# Patient Record
Sex: MALE
[De-identification: names, ages, dates, MRNs, and addresses within clinical notes are randomized per-mention and may not be internally consistent; named-entity substitution may affect disease eponyms.]

---

## 2017-05-02 ENCOUNTER — CHARTING TRANS (OUTPATIENT)
Dept: OTHER | Age: 57
End: 2017-05-02

## 2017-05-02 ASSESSMENT — PAIN SCALES - GENERAL: PAINLEVEL_OUTOF10: 0

## 2018-11-03 VITALS
TEMPERATURE: 98.7 F | DIASTOLIC BLOOD PRESSURE: 72 MMHG | HEIGHT: 70 IN | HEART RATE: 91 BPM | SYSTOLIC BLOOD PRESSURE: 160 MMHG | WEIGHT: 205 LBS | RESPIRATION RATE: 18 BRPM | BODY MASS INDEX: 29.35 KG/M2

## 2021-11-11 ENCOUNTER — APPOINTMENT (OUTPATIENT)
Dept: GENERAL RADIOLOGY | Facility: HOSPITAL | Age: 61
DRG: 645 | End: 2021-11-11
Attending: EMERGENCY MEDICINE
Payer: COMMERCIAL

## 2021-11-11 ENCOUNTER — APPOINTMENT (OUTPATIENT)
Dept: CT IMAGING | Facility: HOSPITAL | Age: 61
DRG: 645 | End: 2021-11-11
Attending: EMERGENCY MEDICINE
Payer: COMMERCIAL

## 2021-11-11 ENCOUNTER — HOSPITAL ENCOUNTER (INPATIENT)
Facility: HOSPITAL | Age: 61
LOS: 3 days | Discharge: ANOTHER HOSPITAL | DRG: 645 | End: 2021-11-14
Attending: EMERGENCY MEDICINE | Admitting: INTERNAL MEDICINE
Payer: COMMERCIAL

## 2021-11-11 DIAGNOSIS — E04.9 GOITER: ICD-10-CM

## 2021-11-11 DIAGNOSIS — R06.03 RESPIRATORY DISTRESS: Primary | ICD-10-CM

## 2021-11-11 DIAGNOSIS — J06.9 UPPER RESPIRATORY TRACT INFECTION, UNSPECIFIED TYPE: ICD-10-CM

## 2021-11-11 PROCEDURE — 96365 THER/PROPH/DIAG IV INF INIT: CPT | Performed by: EMERGENCY MEDICINE

## 2021-11-11 PROCEDURE — 71045 X-RAY EXAM CHEST 1 VIEW: CPT | Performed by: EMERGENCY MEDICINE

## 2021-11-11 PROCEDURE — 94644 CONT INHLJ TX 1ST HOUR: CPT

## 2021-11-11 PROCEDURE — 94640 AIRWAY INHALATION TREATMENT: CPT

## 2021-11-11 PROCEDURE — 83605 ASSAY OF LACTIC ACID: CPT | Performed by: EMERGENCY MEDICINE

## 2021-11-11 PROCEDURE — S0077 INJECTION, CLINDAMYCIN PHOSP: HCPCS | Performed by: EMERGENCY MEDICINE

## 2021-11-11 PROCEDURE — 83880 ASSAY OF NATRIURETIC PEPTIDE: CPT | Performed by: EMERGENCY MEDICINE

## 2021-11-11 PROCEDURE — 84439 ASSAY OF FREE THYROXINE: CPT | Performed by: EMERGENCY MEDICINE

## 2021-11-11 PROCEDURE — 80053 COMPREHEN METABOLIC PANEL: CPT | Performed by: EMERGENCY MEDICINE

## 2021-11-11 PROCEDURE — 36415 COLL VENOUS BLD VENIPUNCTURE: CPT | Performed by: EMERGENCY MEDICINE

## 2021-11-11 PROCEDURE — 84484 ASSAY OF TROPONIN QUANT: CPT | Performed by: EMERGENCY MEDICINE

## 2021-11-11 PROCEDURE — 85379 FIBRIN DEGRADATION QUANT: CPT | Performed by: EMERGENCY MEDICINE

## 2021-11-11 PROCEDURE — 99292 CRITICAL CARE ADDL 30 MIN: CPT | Performed by: EMERGENCY MEDICINE

## 2021-11-11 PROCEDURE — 96375 TX/PRO/DX INJ NEW DRUG ADDON: CPT | Performed by: EMERGENCY MEDICINE

## 2021-11-11 PROCEDURE — 99291 CRITICAL CARE FIRST HOUR: CPT | Performed by: EMERGENCY MEDICINE

## 2021-11-11 PROCEDURE — 85025 COMPLETE CBC W/AUTO DIFF WBC: CPT | Performed by: EMERGENCY MEDICINE

## 2021-11-11 PROCEDURE — 70490 CT SOFT TISSUE NECK W/O DYE: CPT | Performed by: EMERGENCY MEDICINE

## 2021-11-11 PROCEDURE — 96367 TX/PROPH/DG ADDL SEQ IV INF: CPT | Performed by: EMERGENCY MEDICINE

## 2021-11-11 PROCEDURE — 93010 ELECTROCARDIOGRAM REPORT: CPT | Performed by: EMERGENCY MEDICINE

## 2021-11-11 PROCEDURE — 0CJS8ZZ INSPECTION OF LARYNX, VIA NATURAL OR ARTIFICIAL OPENING ENDOSCOPIC: ICD-10-PCS | Performed by: OTOLARYNGOLOGY

## 2021-11-11 PROCEDURE — 84481 FREE ASSAY (FT-3): CPT | Performed by: EMERGENCY MEDICINE

## 2021-11-11 PROCEDURE — 84443 ASSAY THYROID STIM HORMONE: CPT | Performed by: EMERGENCY MEDICINE

## 2021-11-11 PROCEDURE — 87040 BLOOD CULTURE FOR BACTERIA: CPT | Performed by: EMERGENCY MEDICINE

## 2021-11-11 PROCEDURE — 93005 ELECTROCARDIOGRAM TRACING: CPT

## 2021-11-11 RX ORDER — ONDANSETRON 2 MG/ML
4 INJECTION INTRAMUSCULAR; INTRAVENOUS EVERY 6 HOURS PRN
Status: DISCONTINUED | OUTPATIENT
Start: 2021-11-11 | End: 2021-11-11

## 2021-11-11 RX ORDER — PREDNISONE 20 MG/1
40 TABLET ORAL DAILY
COMMUNITY
Start: 2021-11-07 | End: 2021-11-14

## 2021-11-11 RX ORDER — DEXAMETHASONE SODIUM PHOSPHATE 4 MG/ML
6 VIAL (ML) INJECTION ONCE
Status: COMPLETED | OUTPATIENT
Start: 2021-11-11 | End: 2021-11-11

## 2021-11-11 RX ORDER — MULTIVITAMIN WITH FOLIC ACID 400 MCG
1 TABLET ORAL DAILY
COMMUNITY
End: 2021-11-14

## 2021-11-11 RX ORDER — DEXMEDETOMIDINE HYDROCHLORIDE 4 UG/ML
INJECTION, SOLUTION INTRAVENOUS
Status: COMPLETED
Start: 2021-11-11 | End: 2021-11-11

## 2021-11-11 RX ORDER — ALBUTEROL SULFATE 90 UG/1
2 AEROSOL, METERED RESPIRATORY (INHALATION) EVERY 4 HOURS PRN
COMMUNITY
Start: 2021-11-07 | End: 2021-11-14

## 2021-11-11 RX ORDER — METHYLPREDNISOLONE SODIUM SUCCINATE 125 MG/2ML
125 INJECTION, POWDER, LYOPHILIZED, FOR SOLUTION INTRAMUSCULAR; INTRAVENOUS ONCE
Status: COMPLETED | OUTPATIENT
Start: 2021-11-11 | End: 2021-11-11

## 2021-11-11 RX ORDER — BUDESONIDE 0.5 MG/2ML
1 INHALANT ORAL
Status: DISCONTINUED | OUTPATIENT
Start: 2021-11-11 | End: 2021-11-14

## 2021-11-11 RX ORDER — IPRATROPIUM BROMIDE AND ALBUTEROL SULFATE 2.5; .5 MG/3ML; MG/3ML
3 SOLUTION RESPIRATORY (INHALATION)
Status: DISCONTINUED | OUTPATIENT
Start: 2021-11-11 | End: 2021-11-11

## 2021-11-11 RX ORDER — DEXAMETHASONE SODIUM PHOSPHATE 4 MG/ML
6 VIAL (ML) INJECTION EVERY 8 HOURS
Status: DISCONTINUED | OUTPATIENT
Start: 2021-11-11 | End: 2021-11-14

## 2021-11-11 RX ORDER — IPRATROPIUM BROMIDE AND ALBUTEROL SULFATE 2.5; .5 MG/3ML; MG/3ML
3 SOLUTION RESPIRATORY (INHALATION)
Status: DISCONTINUED | OUTPATIENT
Start: 2021-11-11 | End: 2021-11-14

## 2021-11-11 RX ORDER — HEPARIN SODIUM 5000 [USP'U]/ML
5000 INJECTION, SOLUTION INTRAVENOUS; SUBCUTANEOUS EVERY 8 HOURS SCHEDULED
Status: DISCONTINUED | OUTPATIENT
Start: 2021-11-11 | End: 2021-11-14

## 2021-11-11 RX ORDER — SODIUM CHLORIDE 9 MG/ML
INJECTION, SOLUTION INTRAVENOUS CONTINUOUS
Status: DISCONTINUED | OUTPATIENT
Start: 2021-11-11 | End: 2021-11-14

## 2021-11-11 RX ORDER — CLINDAMYCIN PHOSPHATE 600 MG/50ML
600 INJECTION INTRAVENOUS ONCE
Status: COMPLETED | OUTPATIENT
Start: 2021-11-11 | End: 2021-11-11

## 2021-11-11 RX ORDER — DEXMEDETOMIDINE HYDROCHLORIDE 4 UG/ML
INJECTION, SOLUTION INTRAVENOUS CONTINUOUS
Status: DISCONTINUED | OUTPATIENT
Start: 2021-11-11 | End: 2021-11-14

## 2021-11-11 RX ORDER — PROCHLORPERAZINE EDISYLATE 5 MG/ML
5 INJECTION INTRAMUSCULAR; INTRAVENOUS EVERY 8 HOURS PRN
Status: DISCONTINUED | OUTPATIENT
Start: 2021-11-11 | End: 2021-11-14

## 2021-11-11 NOTE — ED QUICK NOTES
Let House Supervisor know that all transfer options have been exhausted now and going to admit patient to ICU here now   Charge RN aware as well

## 2021-11-11 NOTE — PLAN OF CARE
Received pt from ED, SOB and increased WOB on admission to unit. Pt diaphoretic, head leaned back in \"sniffing\" position. Stridor noted. Dr. Chad Baird paged to bedside, assessed, ordered another dose of Racemic Epi and Decadron IV to be given at that time.  P

## 2021-11-11 NOTE — ED QUICK NOTES
[facility] [facility]: no beds; Surge protocol with list  [facility]: no beds  [facility]: no beds

## 2021-11-11 NOTE — CONSULTS
[facility]  Report of Consultation    Floresita Burrows Patient Status:  Emergency    1960 MRN [id number]   Location [facility] [facility] Attending Will, Venkata Beverly MD   Hosp Day # 0 PCP Nestor Oneill MD     Reason for Consultation: and middle ear    Right ear norm tympanic membrane and middle ear   Mastoids left not tender no redness no swelling right not tender no redness no swelling   Oral cavity: normal tongue floor of mouth and tonsils   Nasal exam: turbinates   Congested and sep

## 2021-11-11 NOTE — CONSULTS
Critical Care H&P/Consult     NAME: Domingo Jennings - ROOM: [facility] - MRN: [id number] - Age: 62year old - :  1960    Date of Admission: 2021  6:00 AM  Admission Diagnosis: No admission diagnoses are documented for this encounter.       Assessment Substernal thyroid     Dx in 8/2014: thyroid U/S shows a large multinodular goiter large nodules and substernal extension    • Toxic diffuse goiter     Dx in 5/2014 - thyroid tests are abnormal and TSI positive     Past Surgical History:   Procedure Felipe Hunt (DECADRON) 4 MG/ML injection 6 mg, 6 mg, Intravenous, Once        Review of systems:   12 point ROS otherwise negative unless noted in the HPI    Objective:     Intake/Output Summary (Last 24 hours) at 11/11/2021 1231  Last data filed at 11/11/2021 1154  Gr

## 2021-11-11 NOTE — ED QUICK NOTES
RT at bedside. Pt seated upright in tripod position, pt noted to be lifting his chin with each breath, accessory muscles in use. To monitor closely. MD aware.

## 2021-11-11 NOTE — ED PROVIDER NOTES
Patient Seen in: [facility] Emergency Department      History   Patient presents with:  Difficulty Breathing    Stated Complaint: respiratory distress    Subjective:   HPI    The patient is a 28-year-old male with a history of hyperthyroidism, multi Review of Systems    Positive for stated complaint: respiratory distress  Other systems are as noted in HPI. Constitutional and vital signs reviewed. All other systems reviewed and negative except as noted above.     Physical Exam     ED Triage cooperative with exam       ED Course     Labs Reviewed   COMP METABOLIC PANEL (14) - Abnormal; Notable for the following components:       Result Value    Glucose 178 (*)     All other components within normal limits   PRO BETA NATRIURETIC PEPTIDE - Abnor nebulizer treatment. Blood was obtained and peripheral IV access was established. He was given IV Solu-Medrol. Chest x-ray was obtained.          [facility]          CT SOFT TISSUE OF NECK (CPT=70490)   Final Result    PROCEDURE:  CT SOFT TISSUE OF NECK (CPT=7 No cervical adenopathy is seen. There is mild opacification of the     ethmoid sinus. There is opacification of the left sphenoid sinus. Degenerative changes in the spine. No consolidation is seen within the lung apices. Atrovent, his lung started opening up, he was having a little better air movement, and it was more clearly inspiratory stridor. He was given racemic epinephrine. On repeat examination he was feeling greatly improved. Able to sit back.   And relax little being discharged from [facility] after his ICU stay, Dr. Seth Oneil of ENT at [facility] would gladly take care of the patient afterwards for his thyroid surgery. Per discussion with Dr. Seth Oneil, he was called by Parth Sun and [place] of ENT here at goiter causing airway narrowing and deviation. This involved direct patient intervention, complex decision making, and/or extensive discussions with the patient, family, and clinical staff.                                  Disposition and Plan     Clinical

## 2021-11-11 NOTE — ED INITIAL ASSESSMENT (HPI)
Patient states he was dx with upper respiratory infection on Sunday, testing negative for covid. Patient states he had wheezing and difficulty breathing after using his prescribed inhaler.   Patient found in tripod position on ems arrival.

## 2021-11-11 NOTE — RESPIRATORY THERAPY NOTE
Received pt on 6lpm nc saturating mid 90s. Pt BS diminished, stridor noted. Pt diaphoretic, labored breathing. Pt given racemic epi nebulizer per Dr. Tricia Lopez. Discussed plan for intubation if pt distress continues. Dr. Tricia Lopez discussed with ENT.  Plan to intub

## 2021-11-11 NOTE — ED QUICK NOTES
Per ENT [place], patient airway stable now. Patient states he feels at his baseline now. ENT unable to do surgery today or tomorrow, plan for transfer at this time.

## 2021-11-11 NOTE — H&P
[facility]  Hospitalist Team  History and Physical       Assessment/Plan:     #Stridor, acute respiratory distress - due to large goiter  -ENT and Crit care on consult  -iv dexamethasone  -iv unasyn  -cont nebs, rac epi as needed  -attempted to transfer to [facility] outpatient medications on file.       Social History    Tobacco Use      Smoking status: Never Smoker      Smokeless tobacco: Never Used    Alcohol use: Yes      Comment: 8 to 10 per week       Fam Hx  Family History   Problem Relation Age of Onset   • Canc lesions.     Neurologic: Moving all extremities spontaneously, no focal deficit appreciated     Data Review:    LABS:   Lab Results   Component Value Date    WBC 14.1 11/11/2021    HGB 15.9 11/11/2021    HCT 44.6 11/11/2021    .0 11/11/2021    CREATS left thyroid goiter measures 6.6 x 6.3 by 11 cm. The smaller right thyroid nodule measures 2.9 cm. Both thyroid lobes extend into the upper mediastinum, left much greater than right.   The noncontrast appearance of the parotid and submandibular glands are consolidation. Dictated by (CST): Jennie Cuevas MD on 11/11/2021 at 7:22 AM     Finalized by (CST): Jennie Cuevas MD on 11/11/2021 at 7:23 AM            Outpatient records or previous hospital records reviewed.    [facility] hospitalist to continue to follow patient

## 2021-11-11 NOTE — PROGRESS NOTES
Re-evaluated pt with anesthesia. Discussed should intubation be necessary would err on the side of a smaller ET tube (6.5) if possible and use fiberoptic for visualization to minimize trauma to his distal tracheal obstruction.  Pt currently more calm on low

## 2021-11-11 NOTE — ED QUICK NOTES
Pt awake and alert, skin w/d,resps appear less labored. Pt seated upright on cart, now able to sit back more comfortably. Wife at bedside.

## 2021-11-11 NOTE — ED QUICK NOTES
Pt appears more comfortable on cart. Pt remains in tripod position while receiving racemic epi but appears more comfortable, resps appear less labored. Pt states he is feeling better.      RT at bedside, will place cool air mask upon completion of racemic e

## 2021-11-12 PROCEDURE — 94640 AIRWAY INHALATION TREATMENT: CPT

## 2021-11-12 PROCEDURE — 85025 COMPLETE CBC W/AUTO DIFF WBC: CPT | Performed by: INTERNAL MEDICINE

## 2021-11-12 PROCEDURE — 83735 ASSAY OF MAGNESIUM: CPT | Performed by: NURSE PRACTITIONER

## 2021-11-12 PROCEDURE — 84100 ASSAY OF PHOSPHORUS: CPT | Performed by: NURSE PRACTITIONER

## 2021-11-12 PROCEDURE — 80048 BASIC METABOLIC PNL TOTAL CA: CPT | Performed by: INTERNAL MEDICINE

## 2021-11-12 RX ORDER — LABETALOL HYDROCHLORIDE 5 MG/ML
10 INJECTION, SOLUTION INTRAVENOUS ONCE
Status: COMPLETED | OUTPATIENT
Start: 2021-11-12 | End: 2021-11-12

## 2021-11-12 RX ORDER — HYDRALAZINE HYDROCHLORIDE 20 MG/ML
10 INJECTION INTRAMUSCULAR; INTRAVENOUS
Status: DISCONTINUED | OUTPATIENT
Start: 2021-11-12 | End: 2021-11-14

## 2021-11-12 NOTE — PAYOR COMM NOTE
--------------  ADMISSION REVIEW     Payor: SHANTE ANN  Subscriber #:  [id number]  Authorization Number: [id number]    Admit date: 11/11/21  Admit time:  3:16 PM       REVIEW DOCUMENTATION:     ED Provider Notes      ED Provider Notes signed by Soheila Vu U/S shows a large multinodular goiter large nodules and substernal extension    • Substernal thyroid     Dx in 8/2014: thyroid U/S shows a large multinodular goiter large nodules and substernal extension    • Toxic diffuse goiter     Dx in 5/2014 - thyroid CVA tenderness. Extremities: No deformity, nontender throughout, and normal active range of motion of all 4 extremities. Distal pulses normal and symmetric. No calf swelling, asymmetry, tenderness or cords. Skin: No masses or nodules or abnormalities. Patient was placed on continuous pulse oximetry and cardiac telemetry. Sample was collected for rapid Covid testing. He was given continuous albuterol and Atrovent nebulizer treatment.    Blood was obtained and peripheral IV access was established unremarkable. No acute abnormality is seen within the oral cavity on this     noncontrast examination. No large mass is seen within the nasopharynx or     oropharynx. No cervical adenopathy is seen.   There is mild opacification of the     ethm 11/11/2021 at 7:22 AM         Finalized by (CST): Gil Doty MD on 11/11/2021 at 7:23 AM                Admission disposition: 11/11/2021 12:23 PM       After albuterol and Atrovent, his lung started opening up, he was having a little better air movement, if his airway did worsen he could be intubated. In which case he could then be transferred to [facility] for definitive management.     However if he does improve and he ends up being discharged from [facility] after his ICU stay, Dr. Teresa Alejandro of ENT at (exclusive of billable procedures) was administered to manage the patient's respiratory instability due to his airway compromise due to URI and severely enlarged thyroid goiter causing airway narrowing and deviation.   This involved direct patient intervent johnnie who presents to Er with SOB and wheezing. Pt states his wife recently had PNA and he started feeling sick last week with headache, sore throat, and cough.  Yesterday he couldn't sleep as he felt SOB and this morning his breathing was even more diffic muscle cramps  SKIN:  (-) rashes (-) hair loss  NEUROLOGIC:  (-) paresthesias (-) weakness (-) numbness  PSYCHIATRIC:  (-) disorder of thought or mood  ENDOCRINE:  (-) heat or cold intolerance (-) polyuria (-) polydipsia  HEMATOLOGICAL:  (-) easy bruising COMPARISON:  None. INDICATIONS:  respiratory distress, with stridor, h/o enlarged multinodular goiter, please scan down through goiter and at least down to saw  TECHNIQUE:  Noncontrast CT scanning is performed through the neck soft tissues.   Dose reduc goiter extends into the upper mediastinum, left greater than right. In the region of severe tracheal narrowing, the patent trachea measures 10 x 4 mm in the axial plane. There are pooling secretions within this region which contributes to this narrowing. problem acutely exacerbated by a respiratory infection  - ENT has seen, initial recommendation for transfer to see if a surgeon would be available to address this large goiter but [facility] beds available at [facility]   - Pt had improvement after stero DO  [facility] Hospitalist  [phone]            Subjective:                                                                                     SUBJECTIVE: feels better today. Breathing is improved.  Still having stridor     OBJECTIVE:  Temp:  [97.3 °F (36.3 ° Route User    11/12/2021 0502 New Bag 3 g Intravenous Osman Bruno RN    11/11/2021 2304 New Bag 3 g Intravenous Osman Bruno RN    11/11/2021 1537 New Bag 3 g Intravenous Lora Velazquez RN      Ampicillin-Sulbactam Sodium (UNASYN) 3 g in sodium UNIT/ML injection 5,000 Units     Date Action Dose Route User    11/12/2021 0503 Given 5,000 Units Subcutaneous (Left Lower Abdomen) Trupti Lopez RN    11/11/2021 2104 Given 5,000 Units Subcutaneous (Left Lower Abdomen) Trupti Lopez RN      jaz 11/12/21 0100 — 64 14 116/69 96 % — — — PB    11/12/21 0000 — 70 13 127/75 95 % — — — PB    11/11/21 2300 97.3 °F (36.3 °C) 62 14 111/70 96 % — High flow nasal cannula 6 L/min PB    11/11/21 2200 — 70 18 126/69 96 % — — — PB    11/11/21 2100 — 92 22 167

## 2021-11-12 NOTE — PROGRESS NOTES
[facility]  Progress Note    Lian Cuenca Patient Status:  Inpatient    1960 MRN [id number]   [facility] 4SW-A Attending Roxann Hancock, DO   Hosp Day # 1 PCP Dl Desai MD     Subjective:  Patient feeling significantly finesse Naty with Dr. Eli Boone for definitive treatment.  Veronica London MD  11/12/2021  5:20 PM

## 2021-11-12 NOTE — PROGRESS NOTES
[facility] Pharmacy Note:  Dose Adjustment for ampicillin/sulbactam (UNASYN)    Nathaniel Nascimento is a 62year old patient who has been prescribed ampicillin/sulbactam (UNASYN) 3000 mg every 6 hrs.   The dose has been adjusted to ampicillin/sulbactam (UNASYN) 3000 mg

## 2021-11-12 NOTE — RESPIRATORY THERAPY NOTE
Received pt on 8L NC, pt has audible stridor. 1x rac epi given after discussion with RN. Suction and  ambu bag set up. Airway cart with smaller ETTs placed near pts room.

## 2021-11-12 NOTE — PROGRESS NOTES
[facility]    S: still with sig insp/exp stridor.     Meds:  • dexamethasone Sodium Phosphate  6 mg Intravenous Q8H   • ampicillin-sulbactam  3 g Intravenous Q6H   • Heparin Sodium (Porcine)  5,000 Units Subcutaneous Q8H [address]   • ipratropium the last 168 hours. Recent Labs     11/11/21  0634   DDIMER 0.45     Lab Results   Component Value Date    COVID19 Not Detected 11/11/2021     No results for input(s): ABGPHT, FTEZHA8I, RLMCF9P, ABGHCO3, LM, FIO2 in the last 72 hours.   Recent Labs     11/

## 2021-11-12 NOTE — RESPIRATORY THERAPY NOTE
Patient received, getting duonebs q4, tolerating well, patient still having stidor, will continue to monitor

## 2021-11-12 NOTE — PROGRESS NOTES
[facility] Hospitalist Team  Progress Note      Romeo Bonilla  8/13/1960    Assessment/Plan:     #Stridor, acute respiratory distress - due to large goiter  -ENT and Crit care on consult  -iv dexamethasone  -iv unasyn  -cont nebs, rac epi as needed  -attempted t ipratropium-albuterol  3 mL Nebulization 6 times per day   • budesonide  1 mg Nebulization Daily     Continuous Infusions:   • sodium chloride 75 mL/hr at 11/11/21 1527   • dexmedetomidine 0.4 mcg/kg/hr (11/12/21 0901)     PRN: Prochlorperazine Edisylate

## 2021-11-12 NOTE — PLAN OF CARE
Received patient following RN report with patient resting in bed, HOB >45. Stridor noted, although patient denied difficulty breathing, stating he \"feels much better from earlier\". [facility] notified. Additional dose of Racemic epi given.  Maintaining saturat

## 2021-11-13 PROCEDURE — 80053 COMPREHEN METABOLIC PANEL: CPT | Performed by: NURSE PRACTITIONER

## 2021-11-13 PROCEDURE — 84100 ASSAY OF PHOSPHORUS: CPT | Performed by: NURSE PRACTITIONER

## 2021-11-13 PROCEDURE — 85025 COMPLETE CBC W/AUTO DIFF WBC: CPT | Performed by: NURSE PRACTITIONER

## 2021-11-13 PROCEDURE — 94640 AIRWAY INHALATION TREATMENT: CPT

## 2021-11-13 PROCEDURE — 83735 ASSAY OF MAGNESIUM: CPT | Performed by: NURSE PRACTITIONER

## 2021-11-13 RX ORDER — ALPRAZOLAM 0.25 MG/1
0.25 TABLET ORAL EVERY 4 HOURS PRN
Status: DISCONTINUED | OUTPATIENT
Start: 2021-11-13 | End: 2021-11-14

## 2021-11-13 RX ORDER — MORPHINE SULFATE 2 MG/ML
2 INJECTION, SOLUTION INTRAMUSCULAR; INTRAVENOUS ONCE
Status: COMPLETED | OUTPATIENT
Start: 2021-11-13 | End: 2021-11-13

## 2021-11-13 NOTE — PLAN OF CARE
Assumed care of patient at 0730. Audible stridor at start off shift. O2 via HFlo; able to titrate down with  Much improvement in breathing and stridor by evening. Precedex gtt infusing; titrate to minimal dose.  Patient resting comfortably and able to slee

## 2021-11-13 NOTE — PLAN OF CARE
Assumed care of patient following shift report. Patient is alert and oriented. Patient denies pain. Upon initial assessment, patient clearly anxious and having stridor. Increasing precedex to help, took a while to get patient back to a comfortable level.  P

## 2021-11-13 NOTE — PLAN OF CARE
Received pt AOx4, follows commands, denies pain. precedex gtt infusing, titrated down d/t bradycardia. O2 sats >95% on 3L O2 via HFNC. Stridor and inspiratory wheezes noted, wheezing improved w/ scheduled neb treatments. Stridor increased this evening.  Rac

## 2021-11-13 NOTE — PROGRESS NOTES
[facility]  Progress Note    Wale Costa Patient Status:  Inpatient    1960 MRN [id number]   [facility] 4SW-A Attending Juanjose Carpenter DO   Hosp Day # 2 PCP Anderson Vera MD     Subjective:  Patient feeling significantly finesse discomfort and worsened with anxiety. .  Continue IV abx and IV steroids. Given issues overnight then continue to monitor in ICU. No beds available still at [facility]. Can start clear liquids and take meds with sips. Discussed with patient family.   Discuss

## 2021-11-13 NOTE — PROGRESS NOTES
[facility] Hospitalist Team  Progress Note      Cleve Diaz  8/13/1960    Assessment/Plan:     #Stridor, acute respiratory distress - due to large goiter  -ENT and Crit care on consult  -iv dexamethasone  -iv unasyn  -cont nebs, rac epi as needed  -attempted t PGLU in the last 168 hours.     Meds:   Scheduled:   • ampicillin-sulbactam  3 g Intravenous Q8H   • racepinephrine HCl  0.5 mL Nebulization Once   • dexamethasone Sodium Phosphate  6 mg Intravenous Q8H   • Heparin Sodium (Porcine)  5,000 Units Subcutaneous

## 2021-11-13 NOTE — PROGRESS NOTES
[facility]    S: Overnight with some increased stridor with agitation and increased WOB.   Improved this AM.    Meds:  • ampicillin-sulbactam  3 g Intravenous Q8H   • racepinephrine HCl  0.5 mL Nebulization Once   • dexamethasone Sodium José Antonio 178* 176* 184*   BUN 14 22* 29*   CREATSERUM 0.87 0.91 0.72   GFRAA 108 106 117   GFRNAA 94 91 101   CA 9.4 9.4 8.7   ALB 4.0  --  3.3*    139 142   K 3.8 4.2 4.4    103 108   CO2 25.0 31.0 29.0   ALKPHO 87  --  68   AST 28  --  15   ALT 54  -- M.D.  [facility] and Sleep [facility]

## 2021-11-13 NOTE — PLAN OF CARE
Problem: RESPIRATORY - ADULT  Goal: Achieves optimal ventilation and oxygenation  Description: INTERVENTIONS:  - Assess for changes in respiratory status  - Assess for changes in mentation and behavior  - Position to facilitate oxygenation and minimize res

## 2021-11-14 VITALS
DIASTOLIC BLOOD PRESSURE: 70 MMHG | RESPIRATION RATE: 15 BRPM | BODY MASS INDEX: 29 KG/M2 | SYSTOLIC BLOOD PRESSURE: 138 MMHG | TEMPERATURE: 99 F | HEART RATE: 76 BPM | WEIGHT: 201.25 LBS | OXYGEN SATURATION: 93 %

## 2021-11-14 PROCEDURE — 84100 ASSAY OF PHOSPHORUS: CPT | Performed by: NURSE PRACTITIONER

## 2021-11-14 PROCEDURE — 36600 WITHDRAWAL OF ARTERIAL BLOOD: CPT | Performed by: INTERNAL MEDICINE

## 2021-11-14 PROCEDURE — 82803 BLOOD GASES ANY COMBINATION: CPT | Performed by: INTERNAL MEDICINE

## 2021-11-14 PROCEDURE — 85018 HEMOGLOBIN: CPT | Performed by: INTERNAL MEDICINE

## 2021-11-14 PROCEDURE — 83735 ASSAY OF MAGNESIUM: CPT | Performed by: NURSE PRACTITIONER

## 2021-11-14 PROCEDURE — 94640 AIRWAY INHALATION TREATMENT: CPT

## 2021-11-14 PROCEDURE — 80053 COMPREHEN METABOLIC PANEL: CPT | Performed by: NURSE PRACTITIONER

## 2021-11-14 PROCEDURE — 85025 COMPLETE CBC W/AUTO DIFF WBC: CPT | Performed by: NURSE PRACTITIONER

## 2021-11-14 PROCEDURE — 82375 ASSAY CARBOXYHB QUANT: CPT | Performed by: INTERNAL MEDICINE

## 2021-11-14 PROCEDURE — 83050 HGB METHEMOGLOBIN QUAN: CPT | Performed by: INTERNAL MEDICINE

## 2021-11-14 NOTE — PROGRESS NOTES
[facility]    S: No new events overnight.     Meds:  • ampicillin-sulbactam  3 g Intravenous Q8H   • dexamethasone Sodium Phosphate  6 mg Intravenous Q8H   • Heparin Sodium (Porcine)  5,000 Units Subcutaneous Q8H [address]   • ipratropium-albuter 4.0  --  3.3*    139 142   K 3.8 4.2 4.4    103 108   CO2 25.0 31.0 29.0   ALKPHO 87  --  68   AST 28  --  15   ALT 54  --  39   BILT 0.7  --  0.4   TP 7.8  --  6.7     No results for input(s): PCT in the last 168 hours.   No results for input(s

## 2021-11-14 NOTE — PROGRESS NOTES
[facility]  Progress Note    Domingo Jennings Patient Status:  Inpatient    1960 MRN [id number]   [facility] 4SW-A Attending Christophe Bryant, [address] Day # 3 PCP Marianela Terrazas MD     Subjective:  Patient feeling significantly finesse Would be best to treat this definitively before other issues arise. Agree with transfer to [facility]. Discussed with patient who was comfortable with plan and eager to proceed. Transfer in progress.      Lynelle Schaumann  Otolaryngology - Head & Neck Surgery    T

## 2021-11-14 NOTE — PLAN OF CARE
Assumed care of patient at [address] from ED. Patient stated nasal cannula was uncomfortable requested to take it off. VSS. Discussed plan of care with patient. All questions answered.

## 2021-11-14 NOTE — PROGRESS NOTES
[facility] Hospitalist Team  Progress Note      Cleve Diaz  8/13/1960    Assessment/Plan:     #Stridor, acute respiratory distress - due to large goiter  -ENT and Crit care on consult  -iv dexamethasone  -iv unasyn  -cont nebs, rac epi as needed  -attempted t ALT 54 39 35   AST 28 15 14*   ALB 4.0 3.3* 2.9*       No results for input(s): PGLU in the last 168 hours.     Meds:   Scheduled:   • ampicillin-sulbactam  3 g Intravenous Q8H   • dexamethasone Sodium Phosphate  6 mg Intravenous Q8H   • Heparin Sodium (P

## 2021-11-14 NOTE — PLAN OF CARE
Assumed care of patient at [address]. A/Ox4 on 2L O2. SBP in 180's. Hydralazine given. Precedex titrated to 0.4 for RASS goal of 0 and O2 bumped to 4L to maintain SpO2 >90%.  Patient breathing more comfortably tonight and moved from chair to bed with standby ass

## 2021-11-14 NOTE — PLAN OF CARE
Assumed care of patient this am, discussed with transfer center from [facility] [place], patient transfer in progress.  Patient transferred to [address] at [phone] for higher level of care - surgical intervention of large goiter, report called to Riya MOMIN, jaziel q

## 2021-11-15 NOTE — DISCHARGE SUMMARY
General Medicine Discharge Summary     Patient ID:  Donna Jacobo  62year old  8/13/1960    Admit date: 11/11/2021    Discharge date and time: 11/14/2021  1:30 PM     Attending Physician: Yadira England Tab          Activity: activity as tolerated  Diet: clear liquids, advance as tolerated  Wound Care: as directed  Code Status: Full Code      Exam on day of discharge:      11/14/21  0900   BP: 138/70   Pulse: 76   Resp: 15   Temp:        General: no acute

## 2021-11-15 NOTE — PAYOR COMM NOTE
--------------  DISCHARGE REVIEW----REQUESTING ADDITIONAL DAY 11/13 WITH DISCHARGE ON 11/14      [facility]  Subscriber #:  [id number]  Authorization Number: [id number]    Admit date: 11/11/21  Admit time:   3:16 PM  Discharge Date: 11/14/2021  1:30 243.0 283. 0               Recent Labs   Lab 11/11/21  0616 11/12/21  0438 11/13/21  0518    139 142   K 3.8 4.2 4.4    103 108   CO2 25.0 31.0 29.0   BUN 14 22* 29*   CREATSERUM 0.87 0.91 0.72   CA 9.4 9.4 8.7   MG  --  2.6 2.8*   PHOS  --  3.8

## 2021-12-20 ENCOUNTER — LAB ENCOUNTER (OUTPATIENT)
Dept: LAB | Facility: HOSPITAL | Age: 61
End: 2021-12-20
Payer: COMMERCIAL

## 2021-12-20 DIAGNOSIS — E04.9 GOITER: Primary | ICD-10-CM

## 2021-12-20 PROCEDURE — 36415 COLL VENOUS BLD VENIPUNCTURE: CPT

## 2021-12-20 PROCEDURE — 84439 ASSAY OF FREE THYROXINE: CPT

## 2021-12-20 PROCEDURE — 84481 FREE ASSAY (FT-3): CPT

## 2021-12-20 PROCEDURE — 84443 ASSAY THYROID STIM HORMONE: CPT

## (undated) NOTE — IP AVS SNAPSHOT
[address]            (For Outpatient Use Only) Initial Admit Date: 11/11/2021   Inpt/Obs Admit Date: Inpt: 11/11/21 / Obs: N/A   Discharge Date:    Doneen Martin:  [de-identified]   MRN: [de-identified]   CSN: [id number]   CEID: [id number] [place] [facility]    November 14, 2021

## (undated) NOTE — IP AVS SNAPSHOT
Patient Demographics     Address  [address] [address] Phone  [phone] [facility])  [phone] (Mobile) *Preferred* E-mail Address  [email or web address]      Emergency Contact(s)     Name Relation Home Work [address] [id number] dexamethasone Sodium Phosphate (DECADRON) 4 MG/ML injection 6 mg 11/14/21 0055 Given      [id number] dexamethasone Sodium Phosphate (DECADRON) 4 MG/ML injection 6 mg 11/14/21 1009 Given      [id number] guaiFENesin (ROBITUSSIN) 100mg/5ml LIQUID 100 kg (201 lb 4.5 oz)         Lab Results Last 24 Hours      Arterial blood gas [[id number]] (Abnormal)  Resulted: 11/14/21 1112, Result status: Final result   Ordering provider: Tracy Puri MD  11/14/21 1017 Resulting lab: [facility] ([facility])   Potassium 4.4 3.5 - 5.1 mmol/L — [facility])   Chloride 107 98 - 112 mmol/L — [facility] ([facility])   CO2 29.0 21.0 - 32.0 mmol/L — [facility] ([facility])   Anion Gap 5 0 - 18 mmol/L — [facility] ([facility])   BUN 26 7 - 18 mg/dL [facility]) [facility]   Narrative: The following orders were created for panel order CBC With Differential With Platelet.   Procedure                               Abnormality         Status                     --------- 11/11/2021  3:26 PM Date of Service: 11/11/2021  3:16 PM Status: Signed    : Ricky Villalpando DO (Physician)       [facility]ist Team  History and Physical       Assessment/Plan:     #Stridor, acute respiratory distress - due to large goiter  -ENT History:   Procedure Laterality Date   • HERNIA SURGERY  age 3    inguinal         ALL:  No Known Allergies     No current outpatient medications on file.       Social History    Tobacco Use      Smoking status: Never Smoker      Smokeless tobacco: Never Us Extremities: Extremities normal, atraumatic, no cyanosis or edema. Skin: Skin color, texture, turgor normal. No rashes or lesions.     Neurologic: Moving all extremities spontaneously, no focal deficit appreciated     Data Review:    LABS:   Lab Results upper mediastinum, the patent trachea measures 10 x 4 mm in the axial plane (series 3, image 69). The multinodular enlarged left thyroid goiter measures 6.6 x 6.3 by 11 cm. The smaller right thyroid nodule measures 2.9 cm.   Both thyroid lobes extend into consolidation, pleural effusion, or pneumothorax. CONCLUSION:  1. Left lower lobe atelectasis and or scar without focal consolidation.     Dictated by (CST): Jill Ponce MD on 11/11/2021 at 7:22 AM     Finalized by (CST): Jill Ponce MD on 11/11/20 SCDs  4. Dispo  - ICU, this patient is at risk of further respiratory compromise needing intubation     Discussed at length with patient and family. Critical Care Time greater than: 39 Minutes    Kaylee Fabry, M.D.   Pulmonary and Critical Care Medicine that all of his three daughters are alive. Allergies:No Known Allergies    Current Outpatient Medications:   •  predniSONE 20 MG Oral Tab, Take 40 mg by mouth daily. , Disp: , Rfl:   •  albuterol 108 (90 Base) MCG/ACT Inhalation Aero Soln, Inhale 2 puf MCV 88.1   MCH 31.4   MCHC 35.7   RDW 12.2   NEPRELIM 11.59*   WBC 14.1*   .0     Recent Labs   Lab 11/11/21  0616   *   BUN 14   CREATSERUM 0.87   GFRAA 108   GFRNAA 94   CA 9.4   ALB 4.0      K 3.8      CO2 25.0   ALKPHO 87 specific interventions